# Patient Record
Sex: FEMALE | Race: WHITE | Employment: UNEMPLOYED | URBAN - METROPOLITAN AREA
[De-identification: names, ages, dates, MRNs, and addresses within clinical notes are randomized per-mention and may not be internally consistent; named-entity substitution may affect disease eponyms.]

---

## 2017-12-28 PROBLEM — R10.9 ABDOMINAL PAIN: Status: ACTIVE | Noted: 2017-12-28

## 2023-05-20 ENCOUNTER — HOSPITAL ENCOUNTER (EMERGENCY)
Age: 33
Discharge: HOME | End: 2023-05-20
Attending: STUDENT IN AN ORGANIZED HEALTH CARE EDUCATION/TRAINING PROGRAM
Payer: COMMERCIAL

## 2023-05-20 VITALS
OXYGEN SATURATION: 96 % | HEIGHT: 67 IN | RESPIRATION RATE: 16 BRPM | WEIGHT: 260 LBS | DIASTOLIC BLOOD PRESSURE: 88 MMHG | TEMPERATURE: 97.5 F | HEART RATE: 85 BPM | SYSTOLIC BLOOD PRESSURE: 127 MMHG | BODY MASS INDEX: 40.81 KG/M2

## 2023-05-20 DIAGNOSIS — S61.213A LACERATION OF LEFT MIDDLE FINGER WITHOUT FOREIGN BODY WITHOUT DAMAGE TO NAIL, INITIAL ENCOUNTER: Primary | ICD-10-CM

## 2023-05-20 PROCEDURE — 90714 TD VACC NO PRESV 7 YRS+ IM: CPT | Performed by: STUDENT IN AN ORGANIZED HEALTH CARE EDUCATION/TRAINING PROGRAM

## 2023-05-20 PROCEDURE — 90471 IMMUNIZATION ADMIN: CPT | Performed by: STUDENT IN AN ORGANIZED HEALTH CARE EDUCATION/TRAINING PROGRAM

## 2023-05-20 PROCEDURE — 12001 RPR S/N/AX/GEN/TRNK 2.5CM/<: CPT

## 2023-05-20 PROCEDURE — 99284 EMERGENCY DEPT VISIT MOD MDM: CPT

## 2023-05-20 PROCEDURE — 6360000002 HC RX W HCPCS: Performed by: STUDENT IN AN ORGANIZED HEALTH CARE EDUCATION/TRAINING PROGRAM

## 2023-05-20 RX ORDER — BUSPIRONE HYDROCHLORIDE 30 MG/1
TABLET ORAL
COMMUNITY
Start: 2023-05-18

## 2023-05-20 RX ADMIN — CLOSTRIDIUM TETANI TOXOID ANTIGEN (FORMALDEHYDE INACTIVATED) AND CORYNEBACTERIUM DIPHTHERIAE TOXOID ANTIGEN (FORMALDEHYDE INACTIVATED) 0.5 ML: 5; 2 INJECTION, SUSPENSION INTRAMUSCULAR at 15:49

## 2023-05-20 ASSESSMENT — PAIN DESCRIPTION - ORIENTATION: ORIENTATION: LEFT

## 2023-05-20 ASSESSMENT — PAIN SCALES - GENERAL: PAINLEVEL_OUTOF10: 3

## 2023-05-20 ASSESSMENT — PAIN - FUNCTIONAL ASSESSMENT: PAIN_FUNCTIONAL_ASSESSMENT: 0-10

## 2023-05-20 ASSESSMENT — PAIN DESCRIPTION - LOCATION: LOCATION: HAND

## 2023-05-20 NOTE — ED PROVIDER NOTES
[facility]  ED Provider Note  Department of Emergency Medicine     ED Room:       Written by: Ricardo Stevens DO  Patient Name: Reilly Steiner Date: 2023  3:27 PM  MRN: [id number]    : 1990        Chief Complaint   Patient presents with    Laceration     Left third finger cut with utility knife     - Chief complaint    HPI   Mami Waller is a 27 y.o. female presenting to the ED for evaluation of Laceration (Left third finger cut with utility knife )      History obtained from ***. Patient is *** presenting today via *** for evaluation of *** beginning ***. Patient has a past medical history of ***. Patient's complaints are {DESC; MILD/MOD/SEVERE:28481} in severity, and {Desc; intermittent/persistent/constant:92539}. *** makes it better, *** makes it worse. Associate symptoms include ***. Denies any fevers, neck pain or stiffness, cough or sore throat, chest pain or palpitations, shortness of breath, abdominal pain, nausea or vomiting, diarrhea, black or bloody stools, abnormal urinary symptoms, numbness or tingling anywhere, lower extremity edema or tenderness. ~1 cm superficial, non-bleeding, starting to close already    Review of Systems     Physical Exam     Procedures       Medical Decision Making: This is a 27 y.o. female presenting for evaluation of ***. Please see HPI for further details and additional history. On my evaluation today, patient is ***. Vitals are ***. Exam findings are as documented above; ***.   ***      While not exhaustive, the following diagnoses and their severity were considered: ***      Independent interpretation of Laboratory tests by Ricardo Stevens DO: ***     Independent interpretation of Radiology tests by Ricardo Stevens DO: ***       EKG reviewed and interpreted by me: This EKG is signed by emergency department physician. An EKG was not performed during this encounter.    ***      Labs &

## 2023-05-22 ASSESSMENT — ENCOUNTER SYMPTOMS: COLOR CHANGE: 0
